# Patient Record
Sex: MALE | Race: WHITE
[De-identification: names, ages, dates, MRNs, and addresses within clinical notes are randomized per-mention and may not be internally consistent; named-entity substitution may affect disease eponyms.]

---

## 2018-10-11 ENCOUNTER — HOSPITAL ENCOUNTER (EMERGENCY)
Dept: HOSPITAL 25 - ED | Age: 48
Discharge: HOME | End: 2018-10-11
Payer: COMMERCIAL

## 2018-10-11 VITALS — SYSTOLIC BLOOD PRESSURE: 158 MMHG | DIASTOLIC BLOOD PRESSURE: 78 MMHG

## 2018-10-11 DIAGNOSIS — H11.31: ICD-10-CM

## 2018-10-11 DIAGNOSIS — R00.1: ICD-10-CM

## 2018-10-11 DIAGNOSIS — I10: Primary | ICD-10-CM

## 2018-10-11 LAB
BASOPHILS # BLD AUTO: 0.1 10^3/UL (ref 0–0.2)
EOSINOPHIL # BLD AUTO: 0.3 10^3/UL (ref 0–0.6)
HCT VFR BLD AUTO: 42 % (ref 42–52)
HGB BLD-MCNC: 14.6 G/DL (ref 14–18)
LYMPHOCYTES # BLD AUTO: 2.7 10^3/UL (ref 1–4.8)
MCH RBC QN AUTO: 29 PG (ref 27–31)
MCHC RBC AUTO-ENTMCNC: 34 G/DL (ref 31–36)
MCV RBC AUTO: 85 FL (ref 80–94)
MONOCYTES # BLD AUTO: 0.6 10^3/UL (ref 0–0.8)
NEUTROPHILS # BLD AUTO: 3.6 10^3/UL (ref 1.5–7.7)
NRBC # BLD AUTO: 0 10^3/UL
NRBC BLD QL AUTO: 0.1
PLATELET # BLD AUTO: 250 10^3/UL (ref 150–450)
RBC # BLD AUTO: 4.96 10^6/UL (ref 4–5.4)
WBC # BLD AUTO: 7.3 10^3/UL (ref 3.5–10.8)

## 2018-10-11 PROCEDURE — 99283 EMERGENCY DEPT VISIT LOW MDM: CPT

## 2018-10-11 PROCEDURE — 85025 COMPLETE CBC W/AUTO DIFF WBC: CPT

## 2018-10-11 PROCEDURE — 36415 COLL VENOUS BLD VENIPUNCTURE: CPT

## 2018-10-11 PROCEDURE — 80053 COMPREHEN METABOLIC PANEL: CPT

## 2018-10-11 PROCEDURE — 93005 ELECTROCARDIOGRAM TRACING: CPT

## 2018-10-11 NOTE — ED
Hypertension





- HPI Summary


HPI Summary: 


This patient is a 48 year old M presenting to [facility] accompanied by his wife 

with a chief complaint of high blood pressure since this afternoon. Patient 

notes that he noticed a spot of blood in his right eye.  The patient rates the 

pain 0/10 in severity. Symptoms aggravated by recent stress at work. Symptoms 

alleviated by nothing. Patient reports that he put on some weight this year and 

is physically active. Patient denies vomiting or straining vision.








- History of Current Complaint


Chief Complaint: EDHypertension


Stated Complaint: ELEVATED BLOOD PRESSURE


Time Seen by Provider: 10/11/18 21:25


Hx Obtained From: Patient


Onset/Duration: Started Hours Ago, Atraumatic, Still Present


Timing: Lasting Hours


Aggravating Factor(s): Other: - recent stress at work


Alleviating Factor(s): Nothing


Associated Signs & Symptoms: Other: - spot of blood in right eye





- Allergies/Home Medications


Allergies/Adverse Reactions: 


 Allergies











Allergy/AdvReac Type Severity Reaction Status Date / Time


 


No Known Allergies Allergy   Verified 10/11/18 17:10














PMH/Surg Hx/FS Hx/Imm Hx


Cardiovascular History: 


   Denies: Hx Hypertension


Opthamlomology History: 


   Denies: Hx Legally Blind


EENT History: 


   Denies: Hx Deafness





- Surgical History


Surgery Procedure, Year, and Place: none


Infectious Disease History: No


Infectious Disease History: 


   Denies: Traveled Outside the [place] in Last 30 Days





- Family History


Known Family History: 


   Negative: Seizure Disorder





Review of Systems


Positive: Other - spot of blood in right eye


Negative: Epistaxis


Positive: Other - hypertension.  Negative: Chest Pain


Negative: Vomiting


Negative: Rash


All Other Systems Reviewed And Are Negative: Yes





Physical Exam





- Summary


Physical Exam Summary: 


VITAL SIGNS: Reviewed.


GENERAL: Patient is a well-developed and nourished MALE who is lying 

comfortable in the stretcher. Patient is not in any acute respiratory distress.


HEAD AND FACE: No signs of trauma. No ecchymosis, hematomas or skull 

depressions. No sinus tenderness.


EYES: PERRLA, EOMI x 2, No nystagmus. Right eye has minimal subconjunctival 

hemorrhage at temporal side


EARS: Hearing grossly intact. Ear canals and tympanic membranes are within 

normal limits.


MOUTH: Oropharynx within normal limits.


NECK: Supple, trachea is midline, no adenopathy, no JVD, no carotid bruit, no c-

spine tenderness, neck with full ROM.


CHEST: Symmetric, no tenderness at palpation


LUNGS: Clear to auscultation bilaterally. No wheezing or crackles.


CVS: Regular rate and rhythm, S1 and S2 present, no murmurs or gallops 

appreciated.


ABDOMEN: Soft, non-tender. No signs of distention. No rebound no guarding, and 

no masses palpated. Bowel sounds are normal.


EXTREMITIES: FROM in all major joints, no edema, no cyanosis or clubbing.


NEURO: Alert and oriented x 3. No acute neurological deficits. Speech is normal 

and follows commands.


SKIN: Dry and warm





Triage Information Reviewed: Yes


Vital Signs On Initial Exam: 


 Initial Vitals











Temp Pulse Resp BP Pulse Ox


 


 97.8 F   64   16   164/106   96 


 


 10/11/18 17:07  10/11/18 17:07  10/11/18 17:07  10/11/18 17:07  10/11/18 17:07











Vital Signs Reviewed: Yes





Diagnostics





- Vital Signs


 Vital Signs











  Temp Pulse Resp BP Pulse Ox


 


 10/11/18 20:03  97.6 F  67   158/78  97


 


 10/11/18 17:07  97.8 F  64  16  164/106  96














- Laboratory


Lab Results: 


 Lab Results











  10/11/18 10/11/18 Range/Units





  17:39 17:39 


 


WBC  7.3   (3.5-10.8)  10^3/ul


 


RBC  4.96   (4.00-5.40)  10^6/ul


 


Hgb  14.6   (14.0-18.0)  g/dl


 


Hct  42   (42-52)  %


 


MCV  85   (80-94)  fL


 


MCH  29   (27-31)  pg


 


MCHC  34   (31-36)  g/dl


 


RDW  15   (10.5-15)  %


 


Plt Count  250   (150-450)  10^3/ul


 


MPV  7.7   (7.4-10.4)  um3


 


Neut % (Auto)  49.4   (38-83)  %


 


Lymph % (Auto)  36.5   (25-47)  %


 


Mono % (Auto)  8.5 H   (0-7)  %


 


Eos % (Auto)  4.8   (0-6)  %


 


Baso % (Auto)  0.8   (0-2)  %


 


Absolute Neuts (auto)  3.6   (1.5-7.7)  10^3/ul


 


Absolute Lymphs (auto)  2.7   (1.0-4.8)  10^3/ul


 


Absolute Monos (auto)  0.6   (0-0.8)  10^3/ul


 


Absolute Eos (auto)  0.3   (0-0.6)  10^3/ul


 


Absolute Basos (auto)  0.1   (0-0.2)  10^3/ul


 


Absolute Nucleated RBC  0   10^3/ul


 


Nucleated RBC %  0.1   


 


Sodium   138  (135-145)  mmol/L


 


Potassium   4.2  (3.5-5.0)  mmol/L


 


Chloride   103  (101-111)  mmol/L


 


Carbon Dioxide   29  (22-32)  mmol/L


 


Anion Gap   6  (2-11)  mmol/L


 


BUN   14  (6-24)  mg/dL


 


Creatinine   0.96  (0.67-1.17)  mg/dL


 


Est GFR ( Amer)   101.2  (>60)  


 


Est GFR (Non-Af Amer)   83.6  (>60)  


 


BUN/Creatinine Ratio   14.6  (8-20)  


 


Glucose   95  ()  mg/dL


 


Calcium   9.5  (8.6-10.3)  mg/dL


 


Total Bilirubin   0.50  (0.2-1.0)  mg/dL


 


AST   30  (13-39)  U/L


 


ALT   28  (7-52)  U/L


 


Alkaline Phosphatase   57  ()  U/L


 


Total Protein   7.2  (6.4-8.9)  g/dL


 


Albumin   4.7  (3.2-5.2)  g/dL


 


Globulin   2.5  (2-4)  g/dL


 


Albumin/Globulin Ratio   1.9  (1-3)  











Result Diagrams: 


 10/11/18 17:39





 10/11/18 17:39


Lab Statement: Any lab studies that have been ordered have been reviewed, and 

results considered in the medical decision making process.





Hypertension Course/Dx





- Course


Course Of Treatment: Patient is a 48 year old M reporting high blood pressure 

and right minimal subconjunctival hemorrhage. In the ED, the patient was given 

Catapres. Patient will be discharged home with prescription for lisinopril and 

is advised to monitor his blood pressure and to follow up with his PCP in 1-2 

days. The patient is agreeable with this plan.





- Diagnoses


Provider Diagnoses: 


 Subconjunctival hemorrhage of right eye, Hypertension








Discharge





- Sign-Out/Discharge


Documenting (check all that apply): Patient Departure - discharge home





- Discharge Plan


Condition: Stable


Disposition: HOME


Prescriptions: 


Lisinopril 5 mg PO DAILY #30 tablet


Patient Education Materials:  Subconjunctival Hemorrhage (ED), Hypertension (ED)


Referrals: 


[facility] [Outside] - 2 Days


Additional Instructions: 


Take lisinopril as directed. Monitor your blood pressure. Follow up with your 

primary care physician in 1-2 days. Return to the emergency department with any 

new or worsening symptoms.





- Attestation Statements


Document Initiated by Scribe: Yes


Documenting Scribe: Ashly Pearce


Provider For Whom Scribe is Documenting (Include Credential): Milka Austin MD


Scribe Attestation: 


Ashly CASTRO, scribed for Milka Austin MD on 10/11/18 at 2205.